# Patient Record
Sex: MALE | Race: WHITE | NOT HISPANIC OR LATINO | URBAN - METROPOLITAN AREA
[De-identification: names, ages, dates, MRNs, and addresses within clinical notes are randomized per-mention and may not be internally consistent; named-entity substitution may affect disease eponyms.]

---

## 2019-03-21 ENCOUNTER — IMPORTED ENCOUNTER (OUTPATIENT)
Dept: URBAN - METROPOLITAN AREA CLINIC 43 | Facility: CLINIC | Age: 69
End: 2019-03-21

## 2019-03-21 PROBLEM — H43.811: Noted: 2019-03-21

## 2019-03-21 PROBLEM — H25.013: Noted: 2019-03-21

## 2019-03-21 PROBLEM — H16.223: Noted: 2019-03-21

## 2019-03-21 PROBLEM — H31.002: Noted: 2019-03-21

## 2020-04-19 ASSESSMENT — TONOMETRY
OS_IOP_MMHG: 14.0
OD_IOP_MMHG: 14.0

## 2020-04-19 ASSESSMENT — VISUAL ACUITY
OD_SC: 20/20
OS_SC: 20/20

## 2022-08-29 NOTE — PATIENT DISCUSSION
Retinal exam optional. Pt would still like to be seen for Retina for exam schedule Rosalie Levy schedule today for future appointment for patient ).

## 2022-08-29 NOTE — PATIENT DISCUSSION
Discussed importance of compliance with ocular medications and follow up exams to prevent further  loss of vision.

## 2022-08-29 NOTE — PATIENT DISCUSSION
Recommended seeing a [facility] specialist. Garret obrien or Rafael Rios O.D. (will schedule today for future appointment for patient ).

## 2023-01-09 NOTE — PATIENT DISCUSSION
Physical Therapy Visit    Visit Type: Daily Treatment Note  Visit: 3  Referring Provider: Karli Nguyen NP  Medical Diagnosis (from order): Diagnosis Information    Diagnosis  724.2, 724.3, 338.29 (ICD-9-CM) - M54.41, G89.29 (ICD-10-CM) - Chronic midline low back pain with right-sided sciatica       Patient alert and oriented X3.    SUBJECTIVE                                                                                                               Patient notes that back feels better than it did on Friday but hasn't been in to the office today so far.  Has looked at his desk space but hasn't had a chance since then to change anything at this time.  Functional Change: Got a new bed, low back feels good but upper back more sore and unsure if due to getting used to mattress.    Pain / Symptoms  - Pain rating (out of 10): Current: 2       OBJECTIVE                                                                                                                                       Treatment     Therapeutic Exercise  Patient performed on this date 1/9/23  *lumbar rotations 3x10\" Bilateral   *piriformis stretch 3x20\" Bilateral   *alternate hip flexion with ab set 10x5\" Bilateral   *BKFO with ab set 10x5\" Bilateral   *straight leg raise with ab set 5x5\" Bilateral   Added:  *Sidelying hip abduction 5x Bilateral   *standing hip flexion/ext 10x Bilateral   *squats 10x with ab set      HEP  *SKTC stretch 3x20\" Bilateral  *90/90 hamstrings with active df/pf of foot 3x10 reps Bilateral   *ab set with pelvic neutral position 3x5\"         Skilled input: verbal instruction/cues, tactile instruction/cues and posture correction    Writer verbally educated and received verbal consent for hand placement, positioning of patient, and techniques to be performed today from patient for clothing adjustments for techniques, therapist position for techniques and hand placement and palpation for techniques as described above and how  Stable. they are pertinent to the patient's plan of care.    Home Exercise Program  *above indicates provided as part of home exercise program  Access Code: [email or web address]  URL: [email or web address]  Date: 01/06/2023  Prepared by: Barbara Stevens    Exercises  ? Supine Lower Trunk Rotation - 2 x daily - 7 x weekly - 1-2 sets - 3-5 reps - 10 hold  ? Supine Single Knee to Chest Stretch - 2 x daily - 7 x weekly - 1 sets - 3 reps - 20 hold  ? Supine Hamstring Stretch - 2 x daily - 7 x weekly - 1 sets - 3 reps - 20 hold  ? Supine Piriformis Stretch with Foot on Ground - 2 x daily - 7 x weekly - 1 sets - 3 reps - 20 hold  ? Supine Posterior Pelvic Tilt - 2 x daily - 7 x weekly - 1 sets - 10 reps - 5 hold  ? Supine March - 2 x daily - 7 x weekly - 1 sets - 10 reps - 5 hold  ? Bent Knee Fallouts - 2 x daily - 7 x weekly - 1 sets - 10 reps - 5 hold  ? Active Straight Leg Raise with Quad Set - 2 x daily - 7 x weekly - 1 sets - 10 reps - 5 hold           ASSESSMENT                                                                                                            Patient tolerated standing activities well but instructed to focus on good posture and always stabilizing throughout abdominals and core for positioning.  With standing activities notes that loss of positioning leads to increased pain in low back bringing on symptoms and discussed minimizing reps to maximize proper form and then gradually increase as able.  Education:   - Results of above outlined education: Verbalizes understanding and Demonstrates understanding    PLAN                                                                                                                           Suggestions for next session as indicated: Progress per plan of care, stretching, strengthening, progression to more weightbearing and standing activities        Therapy procedure time and total treatment time can be found documented on the Time Entry  flowsheet